# Patient Record
Sex: MALE | Race: WHITE | NOT HISPANIC OR LATINO | Employment: FULL TIME | URBAN - METROPOLITAN AREA
[De-identification: names, ages, dates, MRNs, and addresses within clinical notes are randomized per-mention and may not be internally consistent; named-entity substitution may affect disease eponyms.]

---

## 2023-04-27 ENCOUNTER — HOSPITAL ENCOUNTER (EMERGENCY)
Facility: HOSPITAL | Age: 23
Discharge: HOME | End: 2023-04-27
Attending: EMERGENCY MEDICINE
Payer: MEDICAID

## 2023-04-27 VITALS
DIASTOLIC BLOOD PRESSURE: 76 MMHG | HEIGHT: 70 IN | HEART RATE: 94 BPM | WEIGHT: 160 LBS | OXYGEN SATURATION: 93 % | TEMPERATURE: 99.4 F | RESPIRATION RATE: 18 BRPM | BODY MASS INDEX: 22.9 KG/M2 | SYSTOLIC BLOOD PRESSURE: 124 MMHG

## 2023-04-27 DIAGNOSIS — J02.0 STREP PHARYNGITIS: Primary | ICD-10-CM

## 2023-04-27 LAB
FLUAV RNA RESP QL NAA+PROBE: NOT DETECTED
FLUBV RNA RESP QL NAA+PROBE: NOT DETECTED
RSV RNA NPH QL NAA+NON-PROBE: NOT DETECTED
S PYO AG THROAT QL: POSITIVE
SARS-COV-2 RNA RESP QL NAA+PROBE: NOT DETECTED

## 2023-04-27 PROCEDURE — 87637 SARSCOV2&INF A&B&RSV AMP PRB: CPT | Performed by: PHYSICIAN ASSISTANT

## 2023-04-27 PROCEDURE — C9803 HOPD COVID-19 SPEC COLLECT: HCPCS

## 2023-04-27 PROCEDURE — 99284 EMERGENCY DEPT VISIT MOD MDM: CPT

## 2023-04-27 PROCEDURE — 87880 STREP A ASSAY W/OPTIC: CPT | Performed by: PHYSICIAN ASSISTANT

## 2023-04-27 RX ORDER — AMOXICILLIN AND CLAVULANATE POTASSIUM 875; 125 MG/1; MG/1
1 TABLET, FILM COATED ORAL 2 TIMES DAILY
Qty: 20 TABLET | Refills: 0 | Status: SHIPPED | OUTPATIENT
Start: 2023-04-27 | End: 2023-05-07

## 2023-04-27 RX ORDER — LIDOCAINE HYDROCHLORIDE 20 MG/ML
15 SOLUTION OROPHARYNGEAL ONCE
Status: COMPLETED | OUTPATIENT
Start: 2023-04-27 | End: 2023-04-27

## 2023-04-27 RX ORDER — ACETAMINOPHEN 500 MG
1000 TABLET ORAL ONCE
Status: COMPLETED | OUTPATIENT
Start: 2023-04-27 | End: 2023-04-27

## 2023-04-27 RX ORDER — BENZOCAINE AND MENTHOL, UNSPECIFIED FORM 15; 2.3 MG/1; MG/1
1 LOZENGE ORAL AS NEEDED
Qty: 32 LOZENGE | Refills: 0 | Status: SHIPPED | OUTPATIENT
Start: 2023-04-27

## 2023-04-27 RX ORDER — METHOCARBAMOL 750 MG/1
750 TABLET, FILM COATED ORAL ONCE
Status: COMPLETED | OUTPATIENT
Start: 2023-04-27 | End: 2023-04-27

## 2023-04-27 RX ORDER — ALUMINA, MAGNESIA, AND SIMETHICONE 2400; 2400; 240 MG/30ML; MG/30ML; MG/30ML
15 SUSPENSION ORAL ONCE
Status: COMPLETED | OUTPATIENT
Start: 2023-04-27 | End: 2023-04-27

## 2023-04-27 RX ORDER — METHOCARBAMOL 750 MG/1
750 TABLET, FILM COATED ORAL 3 TIMES DAILY PRN
Qty: 30 TABLET | Refills: 0 | Status: SHIPPED | OUTPATIENT
Start: 2023-04-27

## 2023-04-27 RX ORDER — ACETAMINOPHEN 500 MG
500 TABLET ORAL EVERY 6 HOURS PRN
Qty: 30 TABLET | Refills: 0 | Status: SHIPPED | OUTPATIENT
Start: 2023-04-27

## 2023-04-27 RX ADMIN — ALUMINUM HYDROXIDE, MAGNESIUM HYDROXIDE, AND DIMETHICONE 15 ML: 400; 400; 40 SUSPENSION ORAL at 19:14

## 2023-04-27 RX ADMIN — METHOCARBAMOL TABLETS 750 MG: 750 TABLET, COATED ORAL at 19:13

## 2023-04-27 RX ADMIN — LIDOCAINE HYDROCHLORIDE 15 ML: 20 SOLUTION ORAL at 19:14

## 2023-04-27 RX ADMIN — ACETAMINOPHEN 1000 MG: 500 TABLET ORAL at 19:12

## 2023-04-27 NOTE — ED PROVIDER NOTES
MD ATTESTATION NOTE    The ELIANA and I have discussed this patient's history, physical exam, and treatment plan.    I provided a substantive portion of the care of this patient. I personally performed the physical exam, in its entirety. The attached note describes my personal findings.      Devan Hackett is a 23 y.o. male who presents to the ED c/o generalized myalgias and sore throat.  This been going on since yesterday.  No difficulty breathing no chills or fever.  Patient has found members who recently tested positive for strep and also COVID-19.      On exam:  GENERAL: not distressed  HENT: nares patent, tonsils are swollen there is no uvular shift no exudates seen  EYES: no scleral icterus  CV: regular rhythm, regular rate  RESPIRATORY: normal effort  ABDOMEN: soft  MUSCULOSKELETAL: no deformity  NEURO: alert, moves all extremities, follows commands  SKIN: warm, dry    Labs  Recent Results (from the past 24 hour(s))   COVID-19, FLU A/B, RSV PCR - Swab, Nasopharynx    Collection Time: 04/27/23  7:09 PM    Specimen: Nasopharynx; Swab   Result Value Ref Range    COVID19 Not Detected Not Detected - Ref. Range    Influenza A PCR Not Detected Not Detected    Influenza B PCR Not Detected Not Detected    RSV, PCR Not Detected Not Detected   Rapid Strep A Screen - Swab, Throat    Collection Time: 04/27/23  7:11 PM    Specimen: Throat; Swab   Result Value Ref Range    Strep A Ag Positive (A) Negative       Radiology  No Radiology Exams Resulted Within Past 24 Hours    Medications given in the ED:  Medications   acetaminophen (TYLENOL) tablet 1,000 mg (1,000 mg Oral Given 4/27/23 1912)   methocarbamol (ROBAXIN) tablet 750 mg (750 mg Oral Given 4/27/23 1913)   aluminum-magnesium hydroxide-simethicone (MAALOX MAX) 400-400-40 MG/5ML suspension 15 mL (15 mL Oral Given 4/27/23 1914)   Lidocaine Viscous HCl (XYLOCAINE) 2 % solution 15 mL (15 mL Mouth/Throat Given 4/27/23 1914)       Orders placed during this visit:  Orders  Placed This Encounter   Procedures   • Rapid Strep A Screen - Swab, Throat   • COVID-19, FLU A/B, RSV PCR - Swab, Nasopharynx       Medical Decision Making:  ED Course as of 04/28/23 0301   Thu Apr 27, 2023 1934 Strep A Ag(!): Positive [MP]   2014 COVID19: Not Detected [MP]   2014 Influenza A PCR: Not Detected [MP]   2014 Influenza B PCR: Not Detected [MP]   2115 This is a 23-year-old male presenting to ED with fever, body aches, sore throat.  Worked up with strep swab as well as COVID/influenza swab.  He is strep positive today.  Patient treated in ED with Tylenol, Robaxin, and GI cocktail.  He is now eating and drinking without difficulty.  Will discharge on course of Augmentin to treat strep pharyngitis as well as Tylenol, Robaxin, and Cepacol for symptoms at home.  Encourage PCP follow-up and discussed ED return precautions.  He is otherwise well-appearing, hemodynamically stable, and therefore appropriate for discharge. [MP]      ED Course User Index  [MP] Yanira Manrique PA-C             PPE: Both the patient and I wore a surgical mask throughout the entire patient encounter.     Diagnosis  Final diagnoses:   Strep pharyngitis        Loco Ruelas MD  04/28/23 0301

## 2023-04-27 NOTE — ED TRIAGE NOTES
Pt reports nasal congestion/drainage, sore throat, body aches, nausea, started about a week ago, neg covid test at home, but covid exposure at work a few days ago, started feeling much worse last night

## 2023-04-27 NOTE — ED PROVIDER NOTES
EMERGENCY DEPARTMENT ENCOUNTER    Room Number:  27/27  Date seen:  4/27/2023  PCP: Provider, No Known        HPI:  Chief Complaint: Generalized body aches    Context: Devan Hackett is a 23 y.o. male who presents to the ED c/o generalized body aches.  Patient reports these started yesterday.  Patient has also had nasal congestion, nausea and vomiting, and sore throat.  Patient's niece recently tested positive for strep throat and has a coworker that recently tested positive for COVID-19.  Patient denies neck stiffness, diarrhea, hematemesis, or any other systemic complaints.  Patient endorses tobacco, alcohol, and marijuana use.      External (non-ED) record review:   · Patient seen at [facility] on 1/28/2021 for nausea and headache.  Reviewed assessment and plan.  Patient had COVID and flu testing at that time that was negative.  Given Toradol injection and was discharged with a prescription for meclizine.  · Chest x-ray performed on 1/24/2020 for anterior chest pain.  No acute radiographic abnormality noted  · Ankle x-ray obtained on 6/27/2019 for left ankle pain.  No fracture or deformity noted on imaging.    PAST MEDICAL HISTORY  Active Ambulatory Problems     Diagnosis Date Noted   • No Active Ambulatory Problems     Resolved Ambulatory Problems     Diagnosis Date Noted   • No Resolved Ambulatory Problems     No Additional Past Medical History         PAST SURGICAL HISTORY  Past Surgical History:   Procedure Laterality Date   • ADENOIDECTOMY     • TONSILLECTOMY     • WISDOM TOOTH EXTRACTION           FAMILY HISTORY  History reviewed. No pertinent family history.      SOCIAL HISTORY  Social History     Socioeconomic History   • Marital status: Single   Tobacco Use   • Smoking status: Never   Vaping Use   • Vaping Use: Every day   Substance and Sexual Activity   • Alcohol use: Yes   • Drug use: Yes     Types: Marijuana         ALLERGIES  Patient has no known allergies.        REVIEW OF  SYSTEMS  Review of Systems   Constitutional: Positive for fever. Negative for chills.   HENT: Positive for congestion and sore throat. Negative for ear pain.    Respiratory: Negative for cough and shortness of breath.    Cardiovascular: Negative for chest pain and palpitations.   Gastrointestinal: Positive for nausea and vomiting. Negative for abdominal pain.   Genitourinary: Negative for dysuria and hematuria.   Musculoskeletal: Positive for myalgias. Negative for arthralgias and joint swelling.   Skin: Negative for pallor and rash.   Neurological: Negative for syncope and headaches.   Psychiatric/Behavioral: Negative for confusion and hallucinations.            PHYSICAL EXAM  ED Triage Vitals   Temp Heart Rate Resp BP SpO2   04/27/23 1813 04/27/23 1813 04/27/23 1813 04/27/23 1816 04/27/23 1813   (!) 102.6 °F (39.2 °C) 117 18 127/73 97 %      Temp src Heart Rate Source Patient Position BP Location FiO2 (%)   -- -- -- -- --              Physical Exam  Constitutional:       Appearance: Normal appearance.      Comments: Skin warm to touch   HENT:      Head: Normocephalic and atraumatic.      Nose: Nose normal.      Mouth/Throat:      Mouth: Mucous membranes are moist.   Eyes:      Conjunctiva/sclera: Conjunctivae normal.      Pupils: Pupils are equal, round, and reactive to light.   Cardiovascular:      Rate and Rhythm: Normal rate and regular rhythm.      Pulses: Normal pulses.      Heart sounds: Normal heart sounds.      Comments: Borderline tachycardia  Pulmonary:      Effort: Pulmonary effort is normal.      Breath sounds: Normal breath sounds.   Abdominal:      General: There is no distension.   Musculoskeletal:         General: Normal range of motion.      Cervical back: Normal range of motion and neck supple.   Skin:     General: Skin is warm.      Capillary Refill: Capillary refill takes less than 2 seconds.   Neurological:      General: No focal deficit present.      Mental Status: He is alert and oriented to  person, place, and time.   Psychiatric:         Mood and Affect: Mood normal.         Vital signs and nursing notes reviewed.          LAB RESULTS  Recent Results (from the past 24 hour(s))   COVID-19, FLU A/B, RSV PCR - Swab, Nasopharynx    Collection Time: 04/27/23  7:09 PM    Specimen: Nasopharynx; Swab   Result Value Ref Range    COVID19 Not Detected Not Detected - Ref. Range    Influenza A PCR Not Detected Not Detected    Influenza B PCR Not Detected Not Detected    RSV, PCR Not Detected Not Detected   Rapid Strep A Screen - Swab, Throat    Collection Time: 04/27/23  7:11 PM    Specimen: Throat; Swab   Result Value Ref Range    Strep A Ag Positive (A) Negative       Ordered the above labs and reviewed the results.              MEDICATIONS GIVEN IN ER  Medications   acetaminophen (TYLENOL) tablet 1,000 mg (1,000 mg Oral Given 4/27/23 1912)   methocarbamol (ROBAXIN) tablet 750 mg (750 mg Oral Given 4/27/23 1913)   aluminum-magnesium hydroxide-simethicone (MAALOX MAX) 400-400-40 MG/5ML suspension 15 mL (15 mL Oral Given 4/27/23 1914)   Lidocaine Viscous HCl (XYLOCAINE) 2 % solution 15 mL (15 mL Mouth/Throat Given 4/27/23 1914)                   MEDICAL DECISION MAKING, PROGRESS, and CONSULTS    All labs have been independently reviewed by me.  All radiology studies have been reviewed by me and I have also reviewed the radiology report.   EKG's independently viewed and interpreted by me.  Discussion below represents my analysis of pertinent findings related to patient's condition, differential diagnosis, treatment plan and final disposition.      Additional sources:    - Chronic or social conditions impacting care: Polysubstance use        Orders placed during this visit:  Orders Placed This Encounter   Procedures   • Rapid Strep A Screen - Swab, Throat   • COVID-19, FLU A/B, RSV PCR - Swab, Nasopharynx   • Discontinue Patient Isolation         Additional orders considered but not ordered:  IV  fluids    Differential diagnosis:  Influenza, COVID-19, strep throat, other viral upper respiratory infection      Independent interpretation of labs, radiology studies, and discussions with consultants:  ED Course as of 04/27/23 2116   Thu Apr 27, 2023 1934 Strep A Ag(!): Positive [MP]   2014 COVID19: Not Detected [MP]   2014 Influenza A PCR: Not Detected [MP]   2014 Influenza B PCR: Not Detected [MP]   2115 This is a 23-year-old male presenting to ED with fever, body aches, sore throat.  Worked up with strep swab as well as COVID/influenza swab.  He is strep positive today.  Patient treated in ED with Tylenol, Robaxin, and GI cocktail.  He is now eating and drinking without difficulty.  Will discharge on course of Augmentin to treat strep pharyngitis as well as Tylenol, Robaxin, and Cepacol for symptoms at home.  Encourage PCP follow-up and discussed ED return precautions.  He is otherwise well-appearing, hemodynamically stable, and therefore appropriate for discharge. [MP]      ED Course User Index  [MP] Yanira Manrique PA-C             Patient was wearing a face mask when I entered the room and they continued to wear a mask throughout their stay in the ED.  I wore PPE, including  gloves, face mask with shield or face mask with goggles whenever I was in the room with patient.     DIAGNOSIS  Final diagnoses:   Strep pharyngitis           Follow Up:  Provider, No Known  Psychiatric [id number]  [phone]    Schedule an appointment as soon as possible for a visit   For follow-up      RX:     Medication List      New Prescriptions    acetaminophen 500 MG tablet  Commonly known as: TYLENOL  Take 1 tablet by mouth Every 6 (Six) Hours As Needed for Mild Pain or Fever.     amoxicillin-clavulanate 875-125 MG per tablet  Commonly known as: AUGMENTIN  Take 1 tablet by mouth 2 (Two) Times a Day for 10 days.     Cepacol 15-2.3 MG lozenge  Generic drug: Benzocaine-Menthol  Dissolve 1 lozenge in the  mouth As Needed (Sore throat).     methocarbamol 750 MG tablet  Commonly known as: ROBAXIN  Take 1 tablet by mouth 3 (Three) Times a Day As Needed (Body aches).           Where to Get Your Medications      You can get these medications from any pharmacy    Bring a paper prescription for each of these medications  · acetaminophen 500 MG tablet  · amoxicillin-clavulanate 875-125 MG per tablet  · Cepacol 15-2.3 MG lozenge  · methocarbamol 750 MG tablet         Latest Documented Vital Signs:  As of 21:15 EDT  BP- 124/76 HR- 94 Temp- 99.4 °F (37.4 °C) (Oral) O2 sat- 93%              --    Please note that portions of this were completed with a voice recognition program.       Note Disclaimer: At [facility], we believe that sharing information builds trust and better relationships. You are receiving this note because you are receiving care at [facility] or recently visited. It is possible you will see health information before a provider has talked with you about it. This kind of information can be easy to misunderstand. To help you fully understand what it means for your health, we urge you to discuss this note with your provider.           Yanira Manrique PA-C  04/27/23 9380     Observation and evaluation of  for suspected infectious condition

## 2023-04-27 NOTE — Clinical Note
[facility] EMERGENCY DEPARTMENT  [address] [facility] [address]  Phone: [phone]    Devan Hackett was seen and treated in our emergency department on 4/27/2023.  He may return to work on 05/01/2023.         Thank you for choosing [facility].    Yanira Manrique PA-C

## 2023-04-28 NOTE — DISCHARGE INSTRUCTIONS
PCP follow-up.  Take the entire course of Augmentin antibiotic as prescribed.  Use Tylenol or ibuprofen for fever and pain.  Use Robaxin muscle relaxer as needed for pain.  Use Cepacol as lozenges to help with sore throat

## 2023-11-27 ENCOUNTER — OFFICE VISIT (OUTPATIENT)
Dept: FAMILY MEDICINE CLINIC | Facility: CLINIC | Age: 23
End: 2023-11-27
Payer: MEDICAID

## 2023-11-27 VITALS
HEART RATE: 81 BPM | DIASTOLIC BLOOD PRESSURE: 80 MMHG | HEIGHT: 70 IN | WEIGHT: 170.6 LBS | SYSTOLIC BLOOD PRESSURE: 124 MMHG | BODY MASS INDEX: 24.42 KG/M2 | OXYGEN SATURATION: 97 %

## 2023-11-27 DIAGNOSIS — Z13.220 LIPID SCREENING: ICD-10-CM

## 2023-11-27 DIAGNOSIS — Z11.59 ENCOUNTER FOR HEPATITIS C SCREENING TEST FOR LOW RISK PATIENT: ICD-10-CM

## 2023-11-27 DIAGNOSIS — Z87.19 HISTORY OF IBS: ICD-10-CM

## 2023-11-27 DIAGNOSIS — Z00.00 ANNUAL PHYSICAL EXAM: Primary | ICD-10-CM

## 2023-11-27 DIAGNOSIS — Z83.49 FAMILY HISTORY OF DIABETES INSIPIDUS: ICD-10-CM

## 2023-11-27 DIAGNOSIS — G43.001 MIGRAINE WITHOUT AURA AND WITH STATUS MIGRAINOSUS, NOT INTRACTABLE: ICD-10-CM

## 2023-11-27 RX ORDER — IBUPROFEN 200 MG
200 TABLET ORAL EVERY 6 HOURS PRN
COMMUNITY

## 2023-11-27 RX ORDER — ATOGEPANT 60 MG/1
60 TABLET ORAL DAILY
Qty: 1 TABLET | Refills: 0 | COMMUNITY
Start: 2023-11-27

## 2023-11-27 NOTE — PROGRESS NOTES
Subjective     Devan Hackett is a 23 y.o. male. Presents today for intermittent diarrhea and constipation.  He has family history of IBS.  He also has frequent migraines with family history of same.  Likely lactose intolerance.    Diet:  eats pasta, pizza, chicken noodle soup.  Notices that cereal and fettucini in ramesh sauce causes stomach issues.    Chief Complaint   Patient presents with    Establish Care    GI Problem     Nausea every Morning  for a long time  Diarrhea for  1 week. Subsided then returned again for several days. Then became Bloated and constipated and gassy.  Seems to be better right now       New patient here to establish care      There is no problem list on file for this patient.    Past Medical History:   Diagnosis Date    GI problem     Headache      Past Surgical History:   Procedure Laterality Date    ADENOIDECTOMY      TONSILLECTOMY      WISDOM TOOTH EXTRACTION       Family History   Problem Relation Age of Onset    Heart disease Maternal Grandmother     Cancer Maternal Grandmother         Ovarian    Hypertension Maternal Grandmother     Diabetes Maternal Grandmother      Social History     Socioeconomic History    Marital status: Single   Tobacco Use    Smoking status: Never    Smokeless tobacco: Never   Vaping Use    Vaping Use: Every day    Devices: Disposable   Substance and Sexual Activity    Alcohol use: Yes     Comment: Rare    Drug use: Yes     Types: Marijuana       Allergies   Allergen Reactions    Cat Hair Extract Other (See Comments)       Current Outpatient Medications on File Prior to Visit   Medication Sig Dispense Refill    ibuprofen (ADVIL,MOTRIN) 200 MG tablet Take 1 tablet by mouth Every 6 (Six) Hours As Needed for Mild Pain.      [DISCONTINUED] acetaminophen (TYLENOL) 500 MG tablet Take 1 tablet by mouth Every 6 (Six) Hours As Needed for Mild Pain or Fever. 30 tablet 0    [DISCONTINUED] Benzocaine-Menthol (Cepacol) 15-2.3 MG lozenge Dissolve 1 lozenge in the mouth  "As Needed (Sore throat). 32 lozenge 0    [DISCONTINUED] methocarbamol (ROBAXIN) 750 MG tablet Take 1 tablet by mouth 3 (Three) Times a Day As Needed (Body aches). 30 tablet 0     No current facility-administered medications on file prior to visit.       Objective   Vitals:    11/27/23 0825   BP: 124/80   BP Location: Right arm   Patient Position: Sitting   Cuff Size: Adult   Pulse: 81   SpO2: 97%   Weight: 77.4 kg (170 lb 9.6 oz)   Height: 177.8 cm (70\")     Body mass index is 24.48 kg/m².  Physical Exam  Constitutional:       Appearance: He is normal weight.   HENT:      Head: Normocephalic and atraumatic.      Right Ear: Tympanic membrane, ear canal and external ear normal.      Left Ear: Tympanic membrane, ear canal and external ear normal.      Mouth/Throat:      Mouth: Mucous membranes are moist.   Eyes:      Extraocular Movements: Extraocular movements intact.      Pupils: Pupils are equal, round, and reactive to light.   Cardiovascular:      Rate and Rhythm: Normal rate and regular rhythm.      Pulses: Normal pulses.      Heart sounds: Normal heart sounds.   Pulmonary:      Effort: Pulmonary effort is normal.      Breath sounds: Normal breath sounds.   Abdominal:      General: Bowel sounds are normal.   Musculoskeletal:         General: Normal range of motion.   Skin:     General: Skin is warm and dry.      Capillary Refill: Capillary refill takes less than 2 seconds.   Neurological:      General: No focal deficit present.      Mental Status: He is alert.      Comments: CN II-XII grossly intact.  He is able to heel walk, toe walk, heel-toe walk, squat and duck walk without difficulty.  He has equal upper and lower body strength on exam.  He has slight rotation of cervical spine on palpation.   Psychiatric:         Mood and Affect: Mood normal.         Procedures     Assessment & Plan   Diagnoses and all orders for this visit:    1. Annual physical exam (Primary)    2. Family history of diabetes insipidus  -   "   Hemoglobin A1c    3. History of IBS  -     CBC & Differential  -     Comprehensive Metabolic Panel  -     Sedimentation Rate  -     C-reactive Protein  -     Ambulatory Referral to Gastroenterology    4. Lipid screening  -     Lipid Panel    5. Encounter for hepatitis C screening test for low risk patient  -     Cancel: Hepatitis Panel, Acute  -     HCV Antibody Rfx To Qnt PCR    6. Migraine without aura and with status migrainosus, not intractable  -     Atogepant (Qulipta) 60 MG tablet; Take 1 tablet by mouth Daily. As needed for migraine  Dispense: 1 tablet; Refill: 0                BMI is within normal parameters. No other follow-up for BMI required.     Return in about 3 months (around 2/27/2024) for Next scheduled follow up.

## 2023-11-28 LAB
ALBUMIN SERPL-MCNC: 4.9 G/DL (ref 4.3–5.2)
ALBUMIN/GLOB SERPL: 2 {RATIO} (ref 1.2–2.2)
ALP SERPL-CCNC: 61 IU/L (ref 44–121)
ALT SERPL-CCNC: 40 IU/L (ref 0–44)
AST SERPL-CCNC: 29 IU/L (ref 0–40)
BASOPHILS # BLD AUTO: 0 X10E3/UL (ref 0–0.2)
BASOPHILS NFR BLD AUTO: 0 %
BILIRUB SERPL-MCNC: 0.3 MG/DL (ref 0–1.2)
BUN SERPL-MCNC: 13 MG/DL (ref 6–20)
BUN/CREAT SERPL: 14 (ref 9–20)
CALCIUM SERPL-MCNC: 9.4 MG/DL (ref 8.7–10.2)
CHLORIDE SERPL-SCNC: 102 MMOL/L (ref 96–106)
CHOLEST SERPL-MCNC: 165 MG/DL (ref 100–199)
CO2 SERPL-SCNC: 27 MMOL/L (ref 20–29)
CREAT SERPL-MCNC: 0.91 MG/DL (ref 0.76–1.27)
CRP SERPL-MCNC: <1 MG/L (ref 0–10)
EGFRCR SERPLBLD CKD-EPI 2021: 122 ML/MIN/1.73
EOSINOPHIL # BLD AUTO: 0 X10E3/UL (ref 0–0.4)
EOSINOPHIL NFR BLD AUTO: 1 %
ERYTHROCYTE [DISTWIDTH] IN BLOOD BY AUTOMATED COUNT: 12.4 % (ref 11.6–15.4)
ERYTHROCYTE [SEDIMENTATION RATE] IN BLOOD BY WESTERGREN METHOD: 4 MM/HR (ref 0–15)
GLOBULIN SER CALC-MCNC: 2.5 G/DL (ref 1.5–4.5)
GLUCOSE SERPL-MCNC: 89 MG/DL (ref 70–99)
HAV IGM SERPL QL IA: NEGATIVE
HBA1C MFR BLD: 5.4 % (ref 4.8–5.6)
HBV CORE IGM SERPL QL IA: NEGATIVE
HBV SURFACE AG SERPL QL IA: NEGATIVE
HCT VFR BLD AUTO: 41.8 % (ref 37.5–51)
HCV AB SERPL QL IA: NORMAL
HCV IGG SERPL QL IA: NON REACTIVE
HDLC SERPL-MCNC: 47 MG/DL
HGB BLD-MCNC: 14.7 G/DL (ref 13–17.7)
IMM GRANULOCYTES # BLD AUTO: 0 X10E3/UL (ref 0–0.1)
IMM GRANULOCYTES NFR BLD AUTO: 0 %
LDLC SERPL CALC-MCNC: 107 MG/DL (ref 0–99)
LYMPHOCYTES # BLD AUTO: 2 X10E3/UL (ref 0.7–3.1)
LYMPHOCYTES NFR BLD AUTO: 41 %
MCH RBC QN AUTO: 31.6 PG (ref 26.6–33)
MCHC RBC AUTO-ENTMCNC: 35.2 G/DL (ref 31.5–35.7)
MCV RBC AUTO: 90 FL (ref 79–97)
MONOCYTES # BLD AUTO: 0.6 X10E3/UL (ref 0.1–0.9)
MONOCYTES NFR BLD AUTO: 12 %
NEUTROPHILS # BLD AUTO: 2.3 X10E3/UL (ref 1.4–7)
NEUTROPHILS NFR BLD AUTO: 46 %
PLATELET # BLD AUTO: 281 X10E3/UL (ref 150–450)
POTASSIUM SERPL-SCNC: 3.8 MMOL/L (ref 3.5–5.2)
PROT SERPL-MCNC: 7.4 G/DL (ref 6–8.5)
RBC # BLD AUTO: 4.65 X10E6/UL (ref 4.14–5.8)
SODIUM SERPL-SCNC: 143 MMOL/L (ref 134–144)
TRIGL SERPL-MCNC: 54 MG/DL (ref 0–149)
VLDLC SERPL CALC-MCNC: 11 MG/DL (ref 5–40)
WBC # BLD AUTO: 4.9 X10E3/UL (ref 3.4–10.8)

## 2024-01-25 ENCOUNTER — OFFICE VISIT (OUTPATIENT)
Dept: FAMILY MEDICINE CLINIC | Facility: CLINIC | Age: 24
End: 2024-01-25
Payer: MEDICAID

## 2024-01-25 VITALS
OXYGEN SATURATION: 100 % | DIASTOLIC BLOOD PRESSURE: 76 MMHG | HEIGHT: 70 IN | BODY MASS INDEX: 23.79 KG/M2 | HEART RATE: 70 BPM | WEIGHT: 166.2 LBS | SYSTOLIC BLOOD PRESSURE: 118 MMHG

## 2024-01-25 DIAGNOSIS — B34.9 VIRAL SYNDROME: Primary | ICD-10-CM

## 2024-01-25 DIAGNOSIS — R11.0 NAUSEA: ICD-10-CM

## 2024-01-25 DIAGNOSIS — K59.00 CONSTIPATION, UNSPECIFIED CONSTIPATION TYPE: ICD-10-CM

## 2024-01-25 DIAGNOSIS — F41.9 ANXIETY: ICD-10-CM

## 2024-01-25 PROBLEM — M62.462 GASTROCNEMIUS EQUINUS OF LEFT LOWER EXTREMITY: Status: RESOLVED | Noted: 2019-06-27 | Resolved: 2024-01-25

## 2024-01-25 PROBLEM — M25.572 LEFT ANKLE PAIN: Status: RESOLVED | Noted: 2019-06-27 | Resolved: 2024-01-25

## 2024-01-25 PROBLEM — M21.42 PES PLANUS OF LEFT FOOT: Status: RESOLVED | Noted: 2019-06-27 | Resolved: 2024-01-25

## 2024-01-25 PROBLEM — M79.672 FOOT PAIN, LEFT: Status: RESOLVED | Noted: 2019-07-11 | Resolved: 2024-01-25

## 2024-01-25 PROBLEM — S46.912A LEFT SHOULDER STRAIN, INITIAL ENCOUNTER: Status: RESOLVED | Noted: 2019-07-11 | Resolved: 2024-01-25

## 2024-01-25 RX ORDER — HYDROXYZINE HYDROCHLORIDE 25 MG/1
25 TABLET, FILM COATED ORAL 3 TIMES DAILY PRN
Qty: 90 TABLET | Refills: 1 | Status: SHIPPED | OUTPATIENT
Start: 2024-01-25

## 2024-01-25 RX ORDER — ONDANSETRON HYDROCHLORIDE 8 MG/1
8 TABLET, FILM COATED ORAL EVERY 8 HOURS PRN
Qty: 30 TABLET | Refills: 0 | Status: SHIPPED | OUTPATIENT
Start: 2024-01-25

## 2024-01-25 RX ORDER — DOCUSATE SODIUM 250 MG
250 CAPSULE ORAL DAILY
Qty: 30 CAPSULE | Refills: 0 | Status: SHIPPED | OUTPATIENT
Start: 2024-01-25

## 2024-01-25 NOTE — LETTER
January 25, 2024     Patient: Devan Hackett   YOB: 2000   Date of Visit: 1/25/2024       To Whom It May Concern:    I have seen Mr. Hackett in my clinic today. It is my medical opinion that Devan Hackett should remain off work until Saturday, January 27th. Please contact me with any questions.        Sincerely,        CHIARA Doyle

## 2024-01-25 NOTE — PROGRESS NOTES
"Subjective   Devan Hackett is a 23 y.o. male.     Chief Complaint   Patient presents with    Shortness of Breath     X 2 days    Nausea     X 2-3 days     History of Present Illness     Viral syndrome: Pt presents  for c/o intermittent nausea, headache, single episode of diarrhea, dizziness, and shortness of breath beginning 2-3 days ago. He states his girlfriend has had similar symptoms. No suspicion of food poisoning. He denies fever/chills, body aches, palpitations, CP, decreased appetite, URI symptoms. 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 He does have an upcoming GI appt on 2/9 for possible IBS. He has tried Imodium, Pepto bismol, and tylenol. Pt does state that he has an albuterol inhaler at home, but it has not helped with his SOA.    Anxiety: Patient self-reports chronic anxiety. He likes to take \"deep breaths\" and sometimes gets anxious when he feels like he cannot get a deep enough breath. He has taken medications prior (unknown), but stopped because he was nervous about taking them d/t poor family history of addiction.     The following portions of the patient's history were reviewed and updated as appropriate: allergies, current medications, past family history, past medical history, past social history, past surgical history and problem list.    Review of Systems   Denies fever/chills, CP,  blood in urine/stool, abd pain, leg swelling.    Objective     Vitals:    01/25/24 1248   BP: 118/76   Pulse: 70   SpO2: 100%      Body mass index is 23.85 kg/m².    Physical Exam  Vitals reviewed.   Constitutional:       General: He is not in acute distress.     Appearance: Normal appearance. He is not ill-appearing or toxic-appearing.   HENT:      Right Ear: Tympanic membrane, ear canal and external ear normal.      Left Ear: Tympanic membrane, ear canal and external ear normal.      Nose: No congestion or rhinorrhea.      Mouth/Throat:      Mouth: " Mucous membranes are moist.      Pharynx: Oropharynx is clear. No oropharyngeal exudate or posterior oropharyngeal erythema.   Eyes:      Conjunctiva/sclera: Conjunctivae normal.   Cardiovascular:      Rate and Rhythm: Normal rate.      Pulses: Normal pulses.      Heart sounds: Normal heart sounds.   Pulmonary:      Effort: Pulmonary effort is normal. No respiratory distress.      Breath sounds: Normal breath sounds. No wheezing.   Abdominal:      General: Bowel sounds are normal.      Palpations: Abdomen is soft.      Tenderness: There is no abdominal tenderness.   Skin:     General: Skin is warm and dry.      Capillary Refill: Capillary refill takes less than 2 seconds.   Neurological:      General: No focal deficit present.      Mental Status: He is alert and oriented to person, place, and time.      Motor: No weakness.   Psychiatric:         Behavior: Behavior normal.       Assessment & Plan   Diagnoses and all orders for this visit:    1. Viral syndrome (Primary)    2. Anxiety  -     hydrOXYzine (ATARAX) 25 MG tablet; Take 1 tablet by mouth 3 (Three) Times a Day As Needed for Anxiety.  Dispense: 90 tablet; Refill: 1    3. Nausea  -     ondansetron (Zofran) 8 MG tablet; Take 1 tablet by mouth Every 8 (Eight) Hours As Needed for Nausea or Vomiting.  Dispense: 30 tablet; Refill: 0    4. Constipation, unspecified constipation type  -     docusate sodium (COLACE) 250 MG capsule; Take 1 capsule by mouth Daily.  Dispense: 30 capsule; Refill: 0      Plan:     Take prescribed meds as needed. Increase water intake. Rest.  Follow up if symptoms worsen or fail to improve    Discussed Care Gaps, ordered referrals and encouraged vaccination updates.       - Pt agrees with plan of care and denies further questions/concerns today  - This document is intended for medical expert use only. Persons  reading this document without medical staff guidance may result in misinterpretation and unintended morbidity     Go to the ER for  any possible life-threatening symptoms such as chest pain or shortness of air.      Please allow 3-5 business days for recommendations based on new results      I personally spent time with this patient, preparing for the visit, reviewing tests, obtaining and/or reviewing a separately obtained history, performing a medically appropriate examination and/or evaluation, counseling and educating the patient/family/caregiver, ordering medications,  documenting information in the medical record and indepentently interpreting results.